# Patient Record
(demographics unavailable — no encounter records)

---

## 2025-06-10 NOTE — CONSULT LETTER
[Dear  ___] : Dear  [unfilled], [Consult Letter:] : I had the pleasure of evaluating your patient, [unfilled]. [Consult Closing:] : Thank you very much for allowing me to participate in the care of this patient.  If you have any questions, please do not hesitate to contact me. [Sincerely,] : Sincerely, [FreeTextEntry3] : FRANCISCO Chow Certified Pediatric Nurse Practitioner  Pediatric Neurology  [facility]

## 2025-06-10 NOTE — HISTORY OF PRESENT ILLNESS
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   f/u 06/10/2025 Currently in 2nd grade, smaller classroom setting (15:2) and IEP in place.  Rec's OT, ST 2x/week and weekly counseling.  Academically doing well, making progress. Performing on grade level.  Currently on Focalin XR 5mg, mainly administered on school days. Denies s/e.  Less emotional outbursts.  Sleeps well. Using Melatonin PRN.   Failed meds: Metadate - more tired/sleepy.   2024 Interval hx 24: He ended the school year on an excellent note according to parent. He did two weeks of summer school. MOC continues to be happy on the dose that he is on. Parent will let teacher know about his previous behavior. He is taking it Monday through Thursday over the summer and not on the weekend. He is attending a new school in [place] in September and will be in 2nd grade. He has an IEP: S&L, ST, PT and OT services. Special education classroom 15:1. He can be emotional. Counseling in school.     Interval hx 24:  is excelling, he is doing wonderful in school. Academically, he is on grade level, has not had anxiety related to school.  He can be emotional at times but no anger or irritability. He is doing well on medication management.    Interval hx 24: According to parent, he switched to an "intermediate school." He started this new school in December, at that point he was on focalin. He was switched to metadate because parent was unable to find the medication. His behavior has been fine at school with the medication. They are starting the full psychoeducational testing process. His school is in [place]. No concerns with appetite or weight. Will continue same medication and dose.      Interval hx: According to MOC,  was evaluated by psych and will be participating in behavioral therapy 1x/wk moving forward. He will also be transitioning to half days due to his behavior at school. He goes from being angry, silly, fidgeting to all over the place with behavior. He will be having play therapy and working on transitions. They want to work through his social and emotional skills. He will be seeing the  1x/wk to help with his behavior. [place] forms reviewed.  [place]ie Forms Score Parent: ADD /9- (6/9) Hyperactivity /- (6/) ODD 8 - (4/8) Conduct Disorder  (3) Anxiety/depression- - (3/)  Performance AV.5  Teacher:  ADD 8- (6/) Hyperactivity - (6/) ODD/ Conduct Disorder 7/10 - (4/10) Anxiety/depression- - (3/)  Performance Avg 5        Review hx: According to mother,  had a psychoeducational evaluation at 4 years old in pre-K, IEP for fine motor skills and speech and social skills. Declassified May of 2022. Moved to [place] this summer 2023. Academically he is below grade level. In school, he is unable to focus, follow directions and transition in the classroom. He needs to constantly be redirected. A letter was sent home stating that In the past 3 days of school,  has vandalized classrooms and administrative offices. Examples include that he has thrown stools, kicking/ hitting principal and other staff members. He is described as not having self- regulation and goes from 0-10 within minutes. At this time, school is requesting that  have a psychological evaluation before returning to school to ensure safety of staff, students and himself.   Educational assessment:  Current Grade: 1st  Current District: [employer] in [place]: [employer]  General ED/ Current Accommodations/ICT: general education   Home assessment: 1:1 attention needed to get homework done. He fidgets through meals. He cannot sit through a movie or TV show. Jumps around between activities at home, at most he can do an activity for 5 minutes before getting distracted. He is easily distracted. He is an only child. He sometimes has issues with other children, with sharing. He needs to be coached and directed, he has issues with boundaries and personal space. At home if he does not get his way he has tantrums, throws things, will try to hit parent. He can use threatening language.   No concern for anxiety, depression, OCD. Appears to get anxious or upset if he gets singled out in class. He will obsess over the event or redirection. Can be very oppositional both at home and school. Bedtime: 9:30 pm, takes him about 1-2 hours to fall asleep. If he has a longer day he will fall asleep within 20 minutes. Melatonin with Zinc and elderberry, maybe 1-2 mg. Denies staring, eye fluttering, twitching, seizure or seizure-like activity. No serious head injury, meningoencephalitis.

## 2025-06-10 NOTE — PLAN
[FreeTextEntry1] : [ ] Continue in school counseling with  [ ] Continue Focalin xr 5 mg in the morning prn, side effects discussed. No refill needed at this time [ ] F/U 3-4 months

## 2025-06-10 NOTE — DATA REVIEWED
[FreeTextEntry1] : [place] Forms Score Parent: ADD - () Hyperactivity - () ODD  - () Conduct Disorder  (3/14) Anxiety/depression- - (3/7)  Performance AV.5  Teacher:  ADD - () Hyperactivity - () ODD/ Conduct Disorder 7/10 - (4/10) Anxiety/depression- - (3/7)  Performance Avg 5

## 2025-06-10 NOTE — REASON FOR VISIT
[Follow-Up Evaluation] : a follow-up evaluation for [Mother] : mother [Medical Records] : medical records [FreeTextEntry2] : inattention and hyperactivity

## 2025-06-10 NOTE — PHYSICAL EXAM
[Well-appearing] : well-appearing [Normocephalic] : normocephalic [No dysmorphic facial features] : no dysmorphic facial features [No deformities] : no deformities [Alert] : alert [Well related, good eye contact] : well related, good eye contact [Conversant] : conversant [Normal speech and language] : normal speech and language [Follows instructions well] : follows instructions well [Full extraocular movements] : full extraocular movements [Normal facial sensation to light touch] : normal facial sensation to light touch [No facial asymmetry or weakness] : no facial asymmetry or weakness [Gross hearing intact] : gross hearing intact [Equal palate elevation] : equal palate elevation [Good shoulder shrug] : good shoulder shrug [Normal tongue movement] : normal tongue movement [Midline tongue, no fasciculations] : midline tongue, no fasciculations [Normal axial and appendicular muscle tone] : normal axial and appendicular muscle tone [Gets up on table without difficulty] : gets up on table without difficulty [No pronator drift] : no pronator drift [No abnormal involuntary movements] : no abnormal involuntary movements [5/5 strength in proximal and distal muscles of arms and legs] : 5/5 strength in proximal and distal muscles of arms and legs [Walks and runs well] : walks and runs well [Good walking balance] : good walking balance [Normal gait] : normal gait [de-identified] : Breathing even and unlabored

## 2025-06-10 NOTE — ASSESSMENT
[FreeTextEntry1] :  is a 7-year-old male with ADHD-Combined type, presented for follow-up. He is currently in 2nd grade, benefiting from a small classroom setting (15:2), an IEP, OT and ST 2x/week, and weekly school counseling. He currently takes Focalin XR 5mg, primarily on school days, without reported side effects. Academically, he is performing at grade level and demonstrating good progress. His emotional outbursts have decreased, and his sleep is good with PRN melatonin use. Plan to continue current medication regimen as it seems to be working.  MOC verbalized understanding. All questions answered.